# Patient Record
Sex: FEMALE | Race: WHITE | NOT HISPANIC OR LATINO
[De-identification: names, ages, dates, MRNs, and addresses within clinical notes are randomized per-mention and may not be internally consistent; named-entity substitution may affect disease eponyms.]

---

## 2018-10-18 ENCOUNTER — TRANSCRIPTION ENCOUNTER (OUTPATIENT)
Age: 58
End: 2018-10-18

## 2021-02-08 ENCOUNTER — OUTPATIENT (OUTPATIENT)
Dept: OUTPATIENT SERVICES | Facility: HOSPITAL | Age: 61
LOS: 1 days | Discharge: HOME | End: 2021-02-08
Payer: COMMERCIAL

## 2021-02-08 DIAGNOSIS — C50.919 MALIGNANT NEOPLASM OF UNSPECIFIED SITE OF UNSPECIFIED FEMALE BREAST: ICD-10-CM

## 2021-02-10 ENCOUNTER — RESULT REVIEW (OUTPATIENT)
Age: 61
End: 2021-02-10

## 2021-02-10 ENCOUNTER — APPOINTMENT (OUTPATIENT)
Dept: HEMATOLOGY ONCOLOGY | Facility: CLINIC | Age: 61
End: 2021-02-10
Payer: COMMERCIAL

## 2021-02-10 VITALS
BODY MASS INDEX: 31.45 KG/M2 | TEMPERATURE: 97.2 F | SYSTOLIC BLOOD PRESSURE: 156 MMHG | HEART RATE: 80 BPM | RESPIRATION RATE: 17 BRPM | HEIGHT: 63.07 IN | OXYGEN SATURATION: 98 % | DIASTOLIC BLOOD PRESSURE: 88 MMHG | WEIGHT: 177.47 LBS

## 2021-02-10 PROCEDURE — 99205 OFFICE O/P NEW HI 60 MIN: CPT

## 2021-02-10 PROCEDURE — 99072 ADDL SUPL MATRL&STAF TM PHE: CPT

## 2021-02-10 PROCEDURE — 88321 CONSLTJ&REPRT SLD PREP ELSWR: CPT

## 2021-02-12 NOTE — HISTORY OF PRESENT ILLNESS
[Disease: _____________________] : Disease: [unfilled] [T: ___] : T[unfilled] [N: ___] : N[unfilled] [M: ___] : M[unfilled] [AJCC Stage: ____] : AJCC Stage: [unfilled] [de-identified] : Please see dictated initial consult note scanned into [facility] [de-identified] : ER+ NV+ Her 2 susan -

## 2021-02-13 LAB — SURGICAL PATHOLOGY STUDY: SIGNIFICANT CHANGE UP

## 2021-06-08 ENCOUNTER — OUTPATIENT (OUTPATIENT)
Dept: OUTPATIENT SERVICES | Facility: HOSPITAL | Age: 61
LOS: 1 days | Discharge: HOME | End: 2021-06-08

## 2021-06-08 DIAGNOSIS — C50.919 MALIGNANT NEOPLASM OF UNSPECIFIED SITE OF UNSPECIFIED FEMALE BREAST: ICD-10-CM

## 2021-06-09 ENCOUNTER — APPOINTMENT (OUTPATIENT)
Dept: HEMATOLOGY ONCOLOGY | Facility: CLINIC | Age: 61
End: 2021-06-09
Payer: COMMERCIAL

## 2021-06-09 VITALS
SYSTOLIC BLOOD PRESSURE: 147 MMHG | BODY MASS INDEX: 31.52 KG/M2 | OXYGEN SATURATION: 98 % | RESPIRATION RATE: 17 BRPM | HEART RATE: 79 BPM | DIASTOLIC BLOOD PRESSURE: 73 MMHG | WEIGHT: 177.91 LBS | HEIGHT: 63.07 IN | TEMPERATURE: 96.6 F

## 2021-06-09 DIAGNOSIS — Z00.00 ENCOUNTER FOR GENERAL ADULT MEDICAL EXAMINATION W/OUT ABNORMAL FINDINGS: ICD-10-CM

## 2021-06-09 PROCEDURE — 99072 ADDL SUPL MATRL&STAF TM PHE: CPT

## 2021-06-09 PROCEDURE — 99214 OFFICE O/P EST MOD 30 MIN: CPT

## 2021-06-09 RX ORDER — CITALOPRAM HYDROBROMIDE 20 MG/1
20 TABLET, FILM COATED ORAL
Refills: 0 | Status: ACTIVE | COMMUNITY

## 2021-06-09 RX ORDER — ROSUVASTATIN CALCIUM 5 MG/1
5 TABLET, FILM COATED ORAL
Refills: 0 | Status: ACTIVE | COMMUNITY

## 2021-06-11 PROBLEM — Z00.00 ENCOUNTER FOR PREVENTIVE HEALTH EXAMINATION: Status: ACTIVE | Noted: 2021-01-11

## 2021-06-11 NOTE — HISTORY OF PRESENT ILLNESS
[de-identified] : The patient has a history of left breast DCIS in 1997 for which she underwent left modified radical mastectomy/immediate reconstruction at [facility], utilizing a latissimus dorsi flap reconstruction (breast surgeon-Nia Castillo).  She had a BRCA 1/2 testing at that time and it returned negative.\par \par The patient's history of present illness began in early May 2020 when she first felt a "ridge" on her right breast.  The patient presented for a mammogram and sonogram on May 11, 2020 with a finding in the right breast of an 8 x 8 x 9 mm irregular and hypoechoic mass at the 11 o'clock axis.  She went on to have a ultrasound-guided core biopsy on May 26, 2020 which returned with invasive lobular carcinoma, Eldon grade 1, with estrogen receptor returning positive, progesterone receptor negative, and HER-2/susan negative.  She went on to have germline cancer genetic predisposition testing in June 2020 which returned without any pathogenic mutations.  On June 16, 2020, she had an MRI of the bilateral breasts that showed in the right breast, 11 to 12 o'clock axis, a 13 x 12 x 15 mm mass with a biopsy clip consistent with a recently diagnosed breast cancer.  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  The patient's tumor was sent for an Oncotype DX assay and returned with recurrence score of 18, correlating to a 5% risk of developing distant recurrence at 9 years should she take tamoxifen or an aromatase inhibitor.  \par \par Consequently, she went on to have adjuvant radiation therapy which was completed on September 14, 2020.  She followed up with a medical oncologist who prescribed anastrozole on which she remained.  \par  [de-identified] : Here today for routin ef/u.\par \par Anastrozole was started 9/20.\par \par She notes difficulty sleeping and lower energy level - since Covid began and not related to AI\par \par She denies any anastrozole related side effects.  \par \par Last mammo/sono 6 mo ago per pt at [facility] - I do not have a copy of that report for my review but was "neg"\par Kourtney LOCKHART several yrs ago

## 2021-06-11 NOTE — PHYSICAL EXAM
[Fully active, able to carry on all pre-disease performance without restriction] : Status 0 - Fully active, able to carry on all pre-disease performance without restriction [Normal] : affect appropriate [de-identified] : R s/p LE with well healed scar, no nipple retraction skin dimling or palp masses. L w/o nipple retraction skin dimpling or palp masses. B/L ax neg

## 2021-08-10 ENCOUNTER — APPOINTMENT (OUTPATIENT)
Dept: RADIOLOGY | Facility: CLINIC | Age: 61
End: 2021-08-10
Payer: COMMERCIAL

## 2021-08-10 ENCOUNTER — RESULT REVIEW (OUTPATIENT)
Age: 61
End: 2021-08-10

## 2021-08-10 ENCOUNTER — OUTPATIENT (OUTPATIENT)
Dept: OUTPATIENT SERVICES | Facility: HOSPITAL | Age: 61
LOS: 1 days | End: 2021-08-10

## 2021-08-10 PROCEDURE — 77085 DXA BONE DENSITY AXL VRT FX: CPT | Mod: 26

## 2021-10-10 ENCOUNTER — OUTPATIENT (OUTPATIENT)
Dept: OUTPATIENT SERVICES | Facility: HOSPITAL | Age: 61
LOS: 1 days | Discharge: HOME | End: 2021-10-10

## 2021-10-10 DIAGNOSIS — C50.919 MALIGNANT NEOPLASM OF UNSPECIFIED SITE OF UNSPECIFIED FEMALE BREAST: ICD-10-CM

## 2021-10-13 ENCOUNTER — APPOINTMENT (OUTPATIENT)
Dept: HEMATOLOGY ONCOLOGY | Facility: CLINIC | Age: 61
End: 2021-10-13
Payer: COMMERCIAL

## 2021-10-13 VITALS
HEART RATE: 89 BPM | HEIGHT: 62.6 IN | RESPIRATION RATE: 16 BRPM | WEIGHT: 179.88 LBS | DIASTOLIC BLOOD PRESSURE: 87 MMHG | SYSTOLIC BLOOD PRESSURE: 161 MMHG | TEMPERATURE: 96.8 F | BODY MASS INDEX: 32.27 KG/M2 | OXYGEN SATURATION: 96 %

## 2021-10-13 PROCEDURE — 99214 OFFICE O/P EST MOD 30 MIN: CPT

## 2021-10-13 RX ORDER — OLMESARTAN MEDOXOMIL 40 MG/1
TABLET, FILM COATED ORAL
Refills: 0 | Status: ACTIVE | COMMUNITY

## 2021-10-14 NOTE — HISTORY OF PRESENT ILLNESS
[de-identified] : The patient has a history of left breast DCIS in 1997 for which she underwent left modified radical mastectomy/immediate reconstruction at [facility], utilizing a latissimus dorsi flap reconstruction (breast surgeon-Nia Castillo).  She had a BRCA 1/2 testing at that time and it returned negative.\par \par The patient's history of present illness began in early May 2020 when she first felt a "ridge" on her right breast.  The patient presented for a mammogram and sonogram on May 11, 2020 with a finding in the right breast of an 8 x 8 x 9 mm irregular and hypoechoic mass at the 11 o'clock axis.  She went on to have a ultrasound-guided core biopsy on May 26, 2020 which returned with invasive lobular carcinoma, Eldon grade 1, with estrogen receptor returning positive, progesterone receptor negative, and HER-2/susan negative.  She went on to have germline cancer genetic predisposition testing in June 2020 which returned without any pathogenic mutations.  On June 16, 2020, she had an MRI of the bilateral breasts that showed in the right breast, 11 to 12 o'clock axis, a 13 x 12 x 15 mm mass with a biopsy clip consistent with a recently diagnosed breast cancer.  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  The patient's tumor was sent for an Oncotype DX assay and returned with recurrence score of 18, correlating to a 5% risk of developing distant recurrence at 9 years should she take tamoxifen or an aromatase inhibitor.  \par \par Consequently, she went on to have adjuvant radiation therapy which was completed on September 14, 2020.  She followed up with a medical oncologist who prescribed anastrozole on which she remained.  \par  [de-identified] : Here today for routine f/u.\par \par Anastrozole was started 9/20.\par \par She notes difficulty sleeping and lower energy level - since Covid began and not related to AI. This persists.\par \par She is concerned re some thickening in her R breast - had a mammo at [facility] last week and does not know the result. Noted I did not get it back yet. \par \par She c/o anxiety relating to her br ca, [place] and [place]\par \par She denies any anastrozole related side effects.    \par \par R mammo/sono1/21 at [facility] - neg; R mammo 10/21 - I do not have the results yet\par DEXA 8/21: osteopenia

## 2021-10-14 NOTE — PHYSICAL EXAM
[Fully active, able to carry on all pre-disease performance without restriction] : Status 0 - Fully active, able to carry on all pre-disease performance without restriction [Normal] : affect appropriate [de-identified] : R s/p LE with well healed scar, no nipple retraction skin dimpling or palp masses. L w/o nipple retraction skin dimpling or palp masses. B/L ax neg

## 2022-03-08 ENCOUNTER — RX RENEWAL (OUTPATIENT)
Age: 62
End: 2022-03-08

## 2022-05-09 ENCOUNTER — OUTPATIENT (OUTPATIENT)
Dept: OUTPATIENT SERVICES | Facility: HOSPITAL | Age: 62
LOS: 1 days | Discharge: HOME | End: 2022-05-09

## 2022-05-09 DIAGNOSIS — C50.919 MALIGNANT NEOPLASM OF UNSPECIFIED SITE OF UNSPECIFIED FEMALE BREAST: ICD-10-CM

## 2022-05-11 ENCOUNTER — APPOINTMENT (OUTPATIENT)
Dept: HEMATOLOGY ONCOLOGY | Facility: CLINIC | Age: 62
End: 2022-05-11
Payer: COMMERCIAL

## 2022-05-11 VITALS
SYSTOLIC BLOOD PRESSURE: 138 MMHG | OXYGEN SATURATION: 94 % | HEIGHT: 62.99 IN | DIASTOLIC BLOOD PRESSURE: 74 MMHG | RESPIRATION RATE: 16 BRPM | BODY MASS INDEX: 31.64 KG/M2 | HEART RATE: 87 BPM | TEMPERATURE: 96.8 F | WEIGHT: 178.55 LBS

## 2022-05-11 PROCEDURE — 99214 OFFICE O/P EST MOD 30 MIN: CPT

## 2022-05-11 NOTE — HISTORY OF PRESENT ILLNESS
[de-identified] : The patient has a history of left breast DCIS in 1997 for which she underwent left modified radical mastectomy/immediate reconstruction at [facility], utilizing a latissimus dorsi flap reconstruction (breast surgeon-Nia Castillo).  She had a BRCA 1/2 testing at that time and it returned negative.\par \par The patient's history of present illness began in early May 2020 when she first felt a "ridge" on her right breast.  The patient presented for a mammogram and sonogram on May 11, 2020 with a finding in the right breast of an 8 x 8 x 9 mm irregular and hypoechoic mass at the 11 o'clock axis.  She went on to have a ultrasound-guided core biopsy on May 26, 2020 which returned with invasive lobular carcinoma, Eldon grade 1, with estrogen receptor returning positive, progesterone receptor negative, and HER-2/susan negative.  She went on to have germline cancer genetic predisposition testing in June 2020 which returned without any pathogenic mutations.  On June 16, 2020, she had an MRI of the bilateral breasts that showed in the right breast, 11 to 12 o'clock axis, a 13 x 12 x 15 mm mass with a biopsy clip consistent with a recently diagnosed breast cancer.  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  The patient's tumor was sent for an Oncotype DX assay and returned with recurrence score of 18, correlating to a 5% risk of developing distant recurrence at 9 years should she take tamoxifen or an aromatase inhibitor.  \par \par Consequently, she went on to have adjuvant radiation therapy which was completed on September 14, 2020.  She followed up with a medical oncologist who prescribed anastrozole on which she remained.  \par  [de-identified] : Here today for routine f/u.\par \par Anastrozole was started 9/20.\par \par She notes difficulty sleeping and lower energy level - since Covid began and not related to AI. This persists.\par \par "I am in a funk. Depressed" She relates this to living [facility] since Covid. When working 2 days a week feels fine but others "in a funk". returning to [place] in 1 mo. \par \par She denies any anastrozole related side effects.    \par \par R mammo/sono 10/21 \par DEXA 8/21: osteopenia

## 2022-05-11 NOTE — PHYSICAL EXAM
[Fully active, able to carry on all pre-disease performance without restriction] : Status 0 - Fully active, able to carry on all pre-disease performance without restriction [Normal] : affect appropriate [de-identified] : R s/p LE with well healed scar, no nipple retraction skin dimpling or palp masses. L w/o nipple retraction skin dimpling or palp masses. B/L ax neg

## 2022-11-15 ENCOUNTER — APPOINTMENT (OUTPATIENT)
Dept: MAMMOGRAPHY | Facility: IMAGING CENTER | Age: 62
End: 2022-11-15

## 2022-11-15 ENCOUNTER — APPOINTMENT (OUTPATIENT)
Dept: ULTRASOUND IMAGING | Facility: IMAGING CENTER | Age: 62
End: 2022-11-15

## 2022-11-25 ENCOUNTER — RX RENEWAL (OUTPATIENT)
Age: 62
End: 2022-11-25

## 2022-12-28 ENCOUNTER — OUTPATIENT (OUTPATIENT)
Dept: OUTPATIENT SERVICES | Facility: HOSPITAL | Age: 62
LOS: 1 days | Discharge: HOME | End: 2022-12-28

## 2022-12-28 DIAGNOSIS — C50.919 MALIGNANT NEOPLASM OF UNSPECIFIED SITE OF UNSPECIFIED FEMALE BREAST: ICD-10-CM

## 2023-01-04 ENCOUNTER — APPOINTMENT (OUTPATIENT)
Dept: HEMATOLOGY ONCOLOGY | Facility: CLINIC | Age: 63
End: 2023-01-04
Payer: COMMERCIAL

## 2023-01-04 VITALS
WEIGHT: 174.8 LBS | DIASTOLIC BLOOD PRESSURE: 71 MMHG | OXYGEN SATURATION: 97 % | RESPIRATION RATE: 16 BRPM | SYSTOLIC BLOOD PRESSURE: 119 MMHG | BODY MASS INDEX: 30.97 KG/M2 | HEART RATE: 77 BPM | HEIGHT: 62.99 IN | TEMPERATURE: 97.5 F

## 2023-01-04 DIAGNOSIS — Z79.811 LONG TERM (CURRENT) USE OF AROMATASE INHIBITORS: ICD-10-CM

## 2023-01-04 DIAGNOSIS — Z17.0 MALIGNANT NEOPLASM OF UNSPECIFIED SITE OF RIGHT FEMALE BREAST: ICD-10-CM

## 2023-01-04 DIAGNOSIS — M85.80 OTHER SPECIFIED DISORDERS OF BONE DENSITY AND STRUCTURE, UNSPECIFIED SITE: ICD-10-CM

## 2023-01-04 DIAGNOSIS — C50.911 MALIGNANT NEOPLASM OF UNSPECIFIED SITE OF RIGHT FEMALE BREAST: ICD-10-CM

## 2023-01-04 PROCEDURE — 99214 OFFICE O/P EST MOD 30 MIN: CPT

## 2023-01-05 DIAGNOSIS — R92.8 OTHER ABNORMAL AND INCONCLUSIVE FINDINGS ON DIAGNOSTIC IMAGING OF BREAST: ICD-10-CM

## 2023-01-05 PROBLEM — Z79.811 AROMATASE INHIBITOR USE: Status: ACTIVE | Noted: 2021-06-11

## 2023-01-05 PROBLEM — M85.80 OSTEOPENIA, UNSPECIFIED LOCATION: Status: ACTIVE | Noted: 2022-05-11

## 2023-01-05 PROBLEM — C50.911 MALIGNANT NEOPLASM OF RIGHT BREAST IN FEMALE, ESTROGEN RECEPTOR POSITIVE, UNSPECIFIED SITE OF BREAST: Status: ACTIVE | Noted: 2021-02-10

## 2023-01-05 NOTE — PHYSICAL EXAM
[Fully active, able to carry on all pre-disease performance without restriction] : Status 0 - Fully active, able to carry on all pre-disease performance without restriction [Normal] : affect appropriate [de-identified] : R s/p LE with well healed scar, no nipple retraction skin dimpling or palp masses. L s/p MRM / reconstruction with no palp masses. B/L ax neg

## 2023-01-05 NOTE — HISTORY OF PRESENT ILLNESS
[de-identified] : The patient has a history of left breast DCIS in 1997 for which she underwent left modified radical mastectomy/immediate reconstruction at [facility], utilizing a latissimus dorsi flap reconstruction (breast surgeon-Nia Castillo).  She had a BRCA 1/2 testing at that time and it returned negative.\par \par The patient's history of present illness began in early May 2020 when she first felt a "ridge" on her right breast.  The patient presented for a mammogram and sonogram on May 11, 2020 with a finding in the right breast of an 8 x 8 x 9 mm irregular and hypoechoic mass at the 11 o'clock axis.  She went on to have a ultrasound-guided core biopsy on May 26, 2020 which returned with invasive lobular carcinoma, Eldon grade 1, with estrogen receptor returning positive, progesterone receptor negative, and HER-2/susan negative.  She went on to have germline cancer genetic predisposition testing in June 2020 which returned without any pathogenic mutations.  On June 16, 2020, she had an MRI of the bilateral breasts that showed in the right breast, 11 to 12 o'clock axis, a 13 x 12 x 15 mm mass with a biopsy clip consistent with a recently diagnosed breast cancer.  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  The patient's tumor was sent for an Oncotype DX assay and returned with recurrence score of 18, correlating to a 5% risk of developing distant recurrence at 9 years should she take tamoxifen or an aromatase inhibitor.  \par \par Consequently, she went on to have adjuvant radiation therapy which was completed on September 14, 2020.  She followed up with a medical oncologist who prescribed anastrozole on which she remained.  \par  [de-identified] : Here today for routine f/u.\par \par Anastrozole was started 9/20.\par \par She denies any anastrozole related side effects.\par \par Had R knee replacement 10/22 - doing better but has spasms behind R knee - has brought her down a bit \par \par Looking forward to moving back to [place] area in the coming mo. Remains somewhat [place] there. Has integrated herself in te local community.     \par \par R mammo/sono 10/21 neg; had mammo/sono 12/15/22 at [facility] - I do not have a copy o fthat report for my review\par DEXA 8/21: osteopenia

## 2024-02-23 RX ORDER — ANASTROZOLE TABLETS 1 MG/1
1 TABLET ORAL
Qty: 90 | Refills: 3 | Status: ACTIVE | COMMUNITY
Start: 2021-02-10 | End: 1900-01-01

## 2024-03-18 ENCOUNTER — APPOINTMENT (OUTPATIENT)
Dept: HEMATOLOGY ONCOLOGY | Facility: CLINIC | Age: 64
End: 2024-03-18